# Patient Record
Sex: FEMALE | Race: WHITE | Employment: FULL TIME | URBAN - METROPOLITAN AREA
[De-identification: names, ages, dates, MRNs, and addresses within clinical notes are randomized per-mention and may not be internally consistent; named-entity substitution may affect disease eponyms.]

---

## 2018-08-29 ENCOUNTER — OFFICE VISIT (OUTPATIENT)
Dept: INTERNAL MEDICINE CLINIC | Age: 43
End: 2018-08-29

## 2018-08-29 VITALS
SYSTOLIC BLOOD PRESSURE: 109 MMHG | OXYGEN SATURATION: 98 % | TEMPERATURE: 99.3 F | HEART RATE: 72 BPM | RESPIRATION RATE: 18 BRPM | HEIGHT: 66 IN | BODY MASS INDEX: 24.17 KG/M2 | WEIGHT: 150.4 LBS | DIASTOLIC BLOOD PRESSURE: 74 MMHG

## 2018-08-29 DIAGNOSIS — R63.5 WEIGHT GAIN: Primary | ICD-10-CM

## 2018-08-29 DIAGNOSIS — Z00.00 PREVENTATIVE HEALTH CARE: ICD-10-CM

## 2018-08-29 DIAGNOSIS — M19.90 INFLAMMATORY ARTHRITIS: ICD-10-CM

## 2018-08-29 RX ORDER — PHENTERMINE HYDROCHLORIDE 37.5 MG/1
37.5 TABLET ORAL
Qty: 30 TAB | Refills: 2 | Status: SHIPPED | OUTPATIENT
Start: 2018-08-29 | End: 2019-09-20 | Stop reason: ALTCHOICE

## 2018-08-29 NOTE — PROGRESS NOTES
HISTORY OF PRESENT ILLNESS Chief Complaint Patient presents with  Weight Management Presents for follow-up. Last seen by me in 2013. 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 She cut back wine and diet about 9 months ago and could not lose weight. Did alfie health diet and lost 10 lb in 2 weeks in May 2018, but then stopped losing weight. Also took prednisone for eustachian tube dysfunction. She is exercising more. Caloric intake 1500 + exercise, then added 500 kush and did not lose weight. Review of Systems All other systems reviewed and are negative, except as noted in HPI Past Medical and Surgical History 
 has a past medical history of Abnormal serum ACE level (2012); Chest pain; Inflammatory arthritis; Intestinal bacterial overgrowth; and Weight gain, abnormal (2011). has a past surgical history that includes hx tonsillectomy and hm sigmoidoscopy. reports that she has never smoked. She has never used smokeless tobacco. She reports that she drinks about 1.0 oz of alcohol per week  She reports that she does not use illicit drugs. family history includes Cancer in her father; Coronary Artery Disease (age of onset: 72) in her father; Depression in her father; Diabetes in her paternal grandmother; Hypertension in her mother; Thyroid Disease in her mother. Physical Exam  
Nursing note and vitals reviewed. Blood pressure 109/74, pulse 72, temperature 99.3 °F (37.4 °C), temperature source Oral, resp. rate 18, height 5' 6\" (1.676 m), weight 150 lb 6.4 oz (68.2 kg), last menstrual period 08/28/2018, SpO2 98 %. Constitutional:  No distress. Eyes: Conjunctivae are normal.  
Ears:  Hearing grossly intact Cardiovascular: Normal rate. regular rhythm, no murmurs or gallops No edema Pulmonary/Chest: Effort normal.   CTAB Musculoskeletal: moves all 4 extremities Neurological: Alert and oriented to person, place, and time. Skin: No rash noted. Psychiatric: Normal mood and affect. Behavior is normal.  
 
ASSESSMENT and PLAN Diagnoses and all orders for this visit: 
 
1. Weight gain - difficulty losing weight despite diet, exercise. She likely has a new set point. Check labs. Given option of phentermine.   
-     TSH 3RD GENERATION 
-     T4, FREE 
-     METABOLIC PANEL, COMPREHENSIVE 
-     CORTISOL, URINE FREE 24 HR 
-     INSULIN 
-     VITAMIN D, 25 HYDROXY 
-     T3 TOTAL 
-     LIPID PANEL 
-     C REACTIVE PROTEIN, QT 
-     phentermine (ADIPEX-P) 37.5 mg tablet; Take 1 Tab by mouth every morning. Max Daily Amount: 37.5 mg. Take 1/2 pill in AM and 1/2 pill in early afternoon 2. Inflammatory arthritis - Currently asymptomatic -     C REACTIVE PROTEIN, QT 3. Preventative health care -     LIPID PANEL 
 
 
lab results and schedule of future lab studies reviewed with patient 
reviewed medications and side effects in detail Return to clinic for further evaluation if new symptoms develop Follow-up Disposition: Not on File Current Outpatient Prescriptions Medication Sig  
 omega 3-dha-epa-fish oil (FISH OIL) 100-160-1,000 mg cap Take 1 Tab by mouth daily.  Calcium-Cholecalciferol, D3, 600 mg(1,500mg) -400 unit chew Take 1 Tab by mouth daily.  phentermine (ADIPEX-P) 37.5 mg tablet Take 1 Tab by mouth every morning. Max Daily Amount: 37.5 mg. Take 1/2 pill in AM and 1/2 pill in early afternoon  fluticasone (FLONASE) 50 mcg/actuation nasal spray SHAKE WELL AND USE 2 SPRAYS IN EACH NOSTRIL EVERY DAY  MULTIVITAMIN (MULTIPLE VITAMIN PO) Take 1 Tab by mouth daily.  hydroxychloroquine (PLAQUENIL) 200 mg tablet  ethinyl estradiol-etonogestrel (NUVARING) 0.12-0.015 mg/24 hr vaginal ring by Intravaginally route. No current facility-administered medications for this visit.

## 2018-08-29 NOTE — MR AVS SNAPSHOT
Tyree Martinez 
 
 
 [address] [facility] [address] [facility] 
[phone] Patient: Marco Rubi MRN: [id number] Archie Robledo Visit Information Date & Time Provider Department Dept. Phone Encounter #  
 8/29/2018  3:45 PM Jeovany Black MD [facility] [address] [facility] [id number] Your Appointments 10/3/2018  2:45 PM  
Complete Physical with Jeovany Black MD  
[facility] Appt Note: annual CPE . Mabeline Sink af 06/28/18  
 [address] [facility] [address]  
[phone]  
  
   
 [address] [facility] [address] Upcoming Health Maintenance Date Due  
 PAP AKA CERVICAL CYTOLOGY 8/18/1995 DTaP/Tdap/Td series (1 - Tdap) 4/24/2007 Influenza Age 5 to Adult 8/1/2018 Allergies as of 8/29/2018  Review Complete On: 8/29/2018 By: Jeovany Black MD  
 No Known Allergies Current Immunizations  Reviewed on 8/14/2014 Name Date Influenza Vaccine (Whole Virus) 10/1/2012 TD Vaccine 4/23/2007 Not reviewed this visit You Were Diagnosed With   
  
 Codes Comments Weight gain    -  Primary ICD-10-CM: R63.5 ICD-9-CM: 783.1 Inflammatory arthritis     ICD-10-CM: M19.90 ICD-9-CM: 714.9 Preventative health care     ICD-10-CM: Z00.00 ICD-9-CM: V70.0 Vitals BP Pulse Temp Resp Height(growth percentile) Weight(growth percentile) 109/74 (BP 1 Location: Left arm, BP Patient Position: Sitting) 72 99.3 °F (37.4 °C) (Oral) 18 5' 6\" (1.676 m) 150 lb 6.4 oz (68.2 kg) LMP SpO2 BMI OB Status Smoking Status 08/28/2018 98% 24.28 kg/m2 Having regular periods Never Smoker Vitals History BMI and BSA Data Body Mass Index Body Surface Area  
 24.28 kg/m 2 1.78 m 2 Preferred Pharmacy Pharmacy Name Phone [facility] [address] AT [address]  [phone] Your Updated Medication List  
  
   
This list is accurate as of 8/29/18  4:30 PM.  Always use your most recent med list.  
  
  
  
  
 Calcium-Cholecalciferol (D3) 600 mg(1,500mg) -400 unit Chew Take 1 Tab by mouth daily. ethinyl estradiol-etonogestrel 0.12-0.015 mg/24 hr vaginal ring Commonly known as:  NUVARING  
by Intravaginally route. FISH -160-1,000 mg Cap Generic drug:  omega 3-dha-epa-fish oil Take 1 Tab by mouth daily. fluticasone 50 mcg/actuation nasal spray Commonly known as:  Nely Fryer SHAKE WELL AND USE 2 SPRAYS IN EACH NOSTRIL EVERY DAY  
  
 hydroxychloroquine 200 mg tablet Commonly known as:  PLAQUENIL  
  
 MULTIPLE VITAMIN PO Take 1 Tab by mouth daily. phentermine 37.5 mg tablet Commonly known as:  ADIPEX-P Take 1 Tab by mouth every morning. Max Daily Amount: 37.5 mg. Take 1/2 pill in AM and 1/2 pill in early afternoon Prescriptions Printed Refills  
 phentermine (ADIPEX-P) 37.5 mg tablet 2 Sig: Take 1 Tab by mouth every morning. Max Daily Amount: 37.5 mg. Take 1/2 pill in AM and 1/2 pill in early afternoon Class: Print Route: Oral  
  
We Performed the Following C REACTIVE PROTEIN, QT [27860 CPT(R)] CORTISOL, URINE FREE 24 HR [40850 CPT(R)] INSULIN [id number] CPT(R)] LIPID PANEL [19925 CPT(R)] METABOLIC PANEL, COMPREHENSIVE [63395 CPT(R)]   
 T3 TOTAL [63383 CPT(R)] T4, FREE [id number] CPT(R)] TSH 3RD GENERATION [83798 CPT(R)] VITAMIN D, 25 HYDROXY [id number] CPT(R)] Introducing [facility] & HEALTH SERVICES! Dear [facility]: Thank you for requesting a [employer] account. Our records indicate that you already have an active [employer] account. You can access your account anytime at [email or web address] Did you know that you can access your hospital and ER discharge instructions at any time in [employer]? You can also review all of your test results from your hospital stay or ER visit. Additional Information If you have questions, please visit the Frequently Asked Questions section of the [employer] website at [email or web address] Remember, [employer] is NOT to be used for urgent needs. For medical emergencies, dial 911. Now available from your iPhone and Android! Please provide this summary of care documentation to your next provider. Your primary care clinician is listed as Joie Fletcher. If you have any questions after today's visit, please call [phone].

## 2018-09-06 LAB
25(OH)D3+25(OH)D2 SERPL-MCNC: 36.9 NG/ML (ref 30–100)
ALBUMIN SERPL-MCNC: 4.4 G/DL (ref 3.5–5.5)
ALBUMIN/GLOB SERPL: 1.8 {RATIO} (ref 1.2–2.2)
ALP SERPL-CCNC: 73 IU/L (ref 39–117)
ALT SERPL-CCNC: 12 IU/L (ref 0–32)
AST SERPL-CCNC: 21 IU/L (ref 0–40)
BILIRUB SERPL-MCNC: 0.7 MG/DL (ref 0–1.2)
BUN SERPL-MCNC: 14 MG/DL (ref 6–24)
BUN/CREAT SERPL: 16 (ref 9–23)
CALCIUM SERPL-MCNC: 9.2 MG/DL (ref 8.7–10.2)
CHLORIDE SERPL-SCNC: 104 MMOL/L (ref 96–106)
CHOLEST SERPL-MCNC: 188 MG/DL (ref 100–199)
CO2 SERPL-SCNC: 22 MMOL/L (ref 20–29)
CORTIS F 24H UR-MRATE: 22 UG/24 HR (ref 0–50)
CORTIS F UR-MCNC: 10 UG/L
CREAT SERPL-MCNC: 0.85 MG/DL (ref 0.57–1)
CRP SERPL-MCNC: 1.4 MG/L (ref 0–4.9)
GLOBULIN SER CALC-MCNC: 2.4 G/DL (ref 1.5–4.5)
GLUCOSE SERPL-MCNC: 88 MG/DL (ref 65–99)
HDLC SERPL-MCNC: 73 MG/DL
INSULIN SERPL-ACNC: 2.8 UIU/ML (ref 2.6–24.9)
INTERPRETATION, 910389: NORMAL
LDLC SERPL CALC-MCNC: 100 MG/DL (ref 0–99)
POTASSIUM SERPL-SCNC: 4.2 MMOL/L (ref 3.5–5.2)
PROT SERPL-MCNC: 6.8 G/DL (ref 6–8.5)
SODIUM SERPL-SCNC: 140 MMOL/L (ref 134–144)
T3 SERPL-MCNC: 80 NG/DL (ref 71–180)
T4 FREE SERPL-MCNC: 1.23 NG/DL (ref 0.82–1.77)
TRIGL SERPL-MCNC: 73 MG/DL (ref 0–149)
TSH SERPL DL<=0.005 MIU/L-ACNC: 2.18 UIU/ML (ref 0.45–4.5)
VLDLC SERPL CALC-MCNC: 15 MG/DL (ref 5–40)

## 2019-03-08 ENCOUNTER — TELEPHONE (OUTPATIENT)
Dept: INTERNAL MEDICINE CLINIC | Age: 44
End: 2019-03-08

## 2019-03-08 NOTE — TELEPHONE ENCOUNTER
Patient called to report that her insurance company pulled medical records. Stated that DR. Moore had put a diagnoses code for Sarcoidosis in her chart. Patient states that she had been checked for this but never diagnosed with it. Patient now needs a letter stating that she doesn't have this from DR. Moore. Please mail to patient at address on file. Patient is aware that DR. Moore will not be in the office next week.      [phone]

## 2019-03-08 NOTE — TELEPHONE ENCOUNTER
Per chart review, there is a note from  [facility] that work up for sarcoid but negative for sarcoidosis.   We can possibly cut and paste part of note in letter for pt?

## 2019-03-27 ENCOUNTER — TELEPHONE (OUTPATIENT)
Dept: INTERNAL MEDICINE CLINIC | Age: 44
End: 2019-03-27

## 2019-03-27 NOTE — TELEPHONE ENCOUNTER
Patient is following up on a request from 03/08 states she's never heard back form any one , requesting this msge be marked urgent so she doesn't have any further delays in communication , she can be reached at [phone], requesting to speak with the nurse today

## 2019-09-18 ENCOUNTER — TELEPHONE (OUTPATIENT)
Dept: INTERNAL MEDICINE CLINIC | Age: 44
End: 2019-09-18

## 2019-09-18 NOTE — TELEPHONE ENCOUNTER
Pt called to schedule an appt with PCP for chest tightness/palpiations. Call was transfer to nurse. I spoke with patient, sx have been present on/off x 6 months. Sx occur mostly after exercise and linger. She experience palpitations after her workout today and decided to call to make an appt. I advise that pt needs to be seen in the ER now for evaluation. Pt declines, states her sx are now gone and she doesn't feel that this is emergent. She had an evaluation years ago by another MD and everything was fine. She wants an appt to see pcp, appt schedule with pcp. I advise if sx return and/or get worse needs ER evaluation prior to appt in the office. Pt verbalized understanding.

## 2019-09-20 ENCOUNTER — OFFICE VISIT (OUTPATIENT)
Dept: INTERNAL MEDICINE CLINIC | Age: 44
End: 2019-09-20

## 2019-09-20 ENCOUNTER — HOSPITAL ENCOUNTER (OUTPATIENT)
Dept: GENERAL RADIOLOGY | Age: 44
Discharge: HOME | End: 2019-09-20
Attending: INTERNAL MEDICINE
Payer: COMMERCIAL

## 2019-09-20 VITALS
TEMPERATURE: 98 F | SYSTOLIC BLOOD PRESSURE: 123 MMHG | BODY MASS INDEX: 25.07 KG/M2 | WEIGHT: 156 LBS | HEART RATE: 67 BPM | RESPIRATION RATE: 18 BRPM | OXYGEN SATURATION: 98 % | HEIGHT: 66 IN | DIASTOLIC BLOOD PRESSURE: 74 MMHG

## 2019-09-20 DIAGNOSIS — R00.2 PALPITATIONS: ICD-10-CM

## 2019-09-20 DIAGNOSIS — R07.89 SENSATION OF CHEST TIGHTNESS: Primary | ICD-10-CM

## 2019-09-20 DIAGNOSIS — Z13.220 SCREENING CHOLESTEROL LEVEL: ICD-10-CM

## 2019-09-20 DIAGNOSIS — R07.89 SENSATION OF CHEST TIGHTNESS: ICD-10-CM

## 2019-09-20 PROCEDURE — 71046 X-RAY EXAM CHEST 2 VIEWS: CPT

## 2019-09-21 LAB
BUN SERPL-MCNC: 16 MG/DL (ref 6–24)
BUN/CREAT SERPL: 17 (ref 9–23)
CALCIUM SERPL-MCNC: 9.6 MG/DL (ref 8.7–10.2)
CHLORIDE SERPL-SCNC: 98 MMOL/L (ref 96–106)
CHOLEST SERPL-MCNC: 221 MG/DL (ref 100–199)
CO2 SERPL-SCNC: 27 MMOL/L (ref 20–29)
CREAT SERPL-MCNC: 0.92 MG/DL (ref 0.57–1)
ERYTHROCYTE [DISTWIDTH] IN BLOOD BY AUTOMATED COUNT: 12.6 % (ref 12.3–15.4)
GLUCOSE SERPL-MCNC: 89 MG/DL (ref 65–99)
HCT VFR BLD AUTO: 40.8 % (ref 34–46.6)
HDLC SERPL-MCNC: 76 MG/DL
HGB BLD-MCNC: 13.7 G/DL (ref 11.1–15.9)
INTERPRETATION, 910389: NORMAL
LDLC SERPL CALC-MCNC: 120 MG/DL (ref 0–99)
MCH RBC QN AUTO: 29.8 PG (ref 26.6–33)
MCHC RBC AUTO-ENTMCNC: 33.6 G/DL (ref 31.5–35.7)
MCV RBC AUTO: 89 FL (ref 79–97)
PLATELET # BLD AUTO: 182 X10E3/UL (ref 150–450)
POTASSIUM SERPL-SCNC: 4.5 MMOL/L (ref 3.5–5.2)
RBC # BLD AUTO: 4.59 X10E6/UL (ref 3.77–5.28)
SODIUM SERPL-SCNC: 139 MMOL/L (ref 134–144)
T4 FREE SERPL-MCNC: 1.1 NG/DL (ref 0.82–1.77)
TRIGL SERPL-MCNC: 124 MG/DL (ref 0–149)
TSH SERPL DL<=0.005 MIU/L-ACNC: 2.86 UIU/ML (ref 0.45–4.5)
VLDLC SERPL CALC-MCNC: 25 MG/DL (ref 5–40)
WBC # BLD AUTO: 4.7 X10E3/UL (ref 3.4–10.8)

## 2019-09-21 NOTE — PROGRESS NOTES
HISTORY OF PRESENT ILLNESS    Chief Complaint   Patient presents with    Chest Pain     off and on for a couple months, not short of breath just tightness    Palpitations     occasional     Chest pain history:  Mg Carrillo reports 2 months onset of chest discomfort described as tightness. in the middle  chest.  The discomfort is intermittent (> 1 hour). Mild severity she's had several episodes daily. she is not currently having chest discomfort. she denies radiation of discomfort to the bilateral  arm. Associated symptoms include: palpitations. Cardiac risk factors include:  none. she has not had a past history of cardiovascular disease but had stress echo 2009. She also reports intermittent palpitations and her pulse increased to 190 while exercising. It was usually 130-150. Palpitations are mild. Denies dizziness or syncope. Denies obvious GERD s/s. S/s do sometimes worsen when supine. No dysphagia. S/s no worse w eating  Denies nsaid use, stimulant decomgestants    Hx of abnormal ACE level, but no clear dx of sarcoid. Denies cough or SOB. Review of Systems   All other systems reviewed and are negative, except as noted in HPI    Past Medical and Surgical History   has a past medical history of Abnormal serum ACE level (2012), Chest pain, DDD (degenerative disc disease), cervical, Eustachian tube dysfunction (05/2018), Inflammatory arthritis, Intestinal bacterial overgrowth, and Weight gain, abnormal (2011). has a past surgical history that includes hx tonsillectomy and hm sigmoidoscopy. reports that she has never smoked. She has never used smokeless tobacco. She reports that she drinks about 1.7 standard drinks of alcohol per week. She reports that she does not use drugs.   family history includes Cancer in her father; Coronary Artery Disease (age of onset: 72) in her father; Depression in her father; Diabetes in her paternal grandmother; Hypertension in her mother; Thyroid Disease in her mother. Physical Exam   Nursing note and vitals reviewed. Blood pressure 123/74, pulse 67, temperature 98 °F (36.7 °C), temperature source Oral, resp. rate 18, height 5' 6\" (1.676 m), weight 156 lb (70.8 kg), SpO2 98 %. Constitutional: In no distress. Eyes: Conjunctivae are normal.  HEENT:  No LAD or thyromegaly   Cardiovascular: Normal rate. regular rhythm. No murmurs  No edema  Pulmonary/Chest: Effort normal. clear to ausculation blaterally  Musculoskeletal:  no edema. Abd:  Neurological: Alert and oriented. Grossly intact cranial nerves and motor function. Skin: No rash noted. Psychiatric: Normal mood and affect. Behavior is normal.     ASSESSMENT and PLAN  Diagnoses and all orders for this visit:    1. Sensation of chest tightness  Unclear etiology. Suspect this is GERD, MSK pain or anxiety, less likely cardiac, unless associated w arrhythmia. .    Will work up as below  CXR to eval for hilar LAD, nodules, but no s/s sarcoid  Consider 2 week trial of PPI    2. Palpitations  Mild, mostly with exercise. EKG normal.    Need to eval for SVT, atrial arrhythmia . Stress ekg first.  Past echo normal, so less likely structural  Check labs  Consider cardiology referral/event monitor  -     AMB POC EKG ROUTINE W/ 12 LEADS, INTER & REP  -     TSH 3RD GENERATION  -     T4, FREE  -     METABOLIC PANEL, BASIC  -     EXERCISE CARDIAC STRESS TEST; Future    3. Screening cholesterol level  -     LIPID PANEL  -     CBC W/O DIFF    Other orders  -     CBC W/O DIFF  -     LIPID PANEL  -     CVD REPORT        lab results and schedule of future lab studies reviewed with patient  reviewed medications and side effects in detail    Return to clinic for further evaluation if new symptoms develop or if current symptoms worsen or fail to resolve. There are no Patient Instructions on file for this visit.

## 2021-05-04 ENCOUNTER — OFFICE VISIT (OUTPATIENT)
Dept: INTERNAL MEDICINE CLINIC | Age: 46
End: 2021-05-04
Payer: COMMERCIAL

## 2021-05-04 VITALS
DIASTOLIC BLOOD PRESSURE: 84 MMHG | BODY MASS INDEX: 25.88 KG/M2 | TEMPERATURE: 98.6 F | HEIGHT: 66 IN | SYSTOLIC BLOOD PRESSURE: 121 MMHG | OXYGEN SATURATION: 97 % | HEART RATE: 79 BPM | RESPIRATION RATE: 16 BRPM | WEIGHT: 161 LBS

## 2021-05-04 DIAGNOSIS — S61.211A LACERATION OF LEFT INDEX FINGER WITHOUT FOREIGN BODY WITHOUT DAMAGE TO NAIL, INITIAL ENCOUNTER: Primary | ICD-10-CM

## 2021-05-04 DIAGNOSIS — Z23 ENCOUNTER FOR IMMUNIZATION: ICD-10-CM

## 2021-05-04 PROCEDURE — 90471 IMMUNIZATION ADMIN: CPT | Performed by: NURSE PRACTITIONER

## 2021-05-04 PROCEDURE — 90715 TDAP VACCINE 7 YRS/> IM: CPT | Performed by: NURSE PRACTITIONER

## 2021-05-04 PROCEDURE — 99214 OFFICE O/P EST MOD 30 MIN: CPT | Performed by: NURSE PRACTITIONER

## 2021-05-04 NOTE — PROGRESS NOTES
Wayne Lesch (: 1975) is a 54 y.o. female, established patient, here for evaluation of the following chief complaint(s): Other (a month ago cut her finger bleeding, swelling, not infected - )       ASSESSMENT/PLAN:  Below is the assessment and plan developed based on review of pertinent history, physical exam, labs, studies, and medications. 1. Laceration of left index finger without foreign body without damage to nail, initial encounter -- wound cleaned; edges approximated and steri-strips applied. Wound redressed and finger splint given to wear for the next 48 hours before removing. Continue to change bandage on a daily basis but advised to leave steri-strips in place until edges curl. If fails to heal, will need to involve hand specialist.    2. Encounter for immunization  -     TETANUS, [address] [place] [address] ([facility]), IN INDIVIDS. >=7, IM  -     NH IMMUNIZ ADMIN,1 SINGLE/COMB VAC/TOXOID        SUBJECTIVE/OBJECTIVE:  HPI    Patient of Dr Ryder who has not been seen in office since 2019 presents with complaints of nonhealing wound to left index finger; reports she was using a new knife 4 weeks ago and accidentally cut her finger across knuckle. Area bled profusely but she elected not to be seen in ED for sutures so she applied pressure and keep finger immobilized in a splint for 2-3 weeks. Removed splint and was assisting her son with moving when she hit left finger against an object and wound began to bleed again. Has not noted any redness to site or purulent drainage from wound. Last tetanus over 10 years ago. Denies fever, chills.     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   Social Needs    Financial resource strain: Not on file    Food insecurity     Worry: Not on file     Inability: Not on file   [employer] needs     Medical: Not on file     Non-medical: Not on file   Tobacco Use    Smoking status: Never Smoker    Smokeless tobacco: Never Used   Substance and Sexual Activity    Alcohol use: Yes     Alcohol/week: 1.7 standard drinks     Types: 2 Glasses of wine per week     Comment: occasionally    Drug use: No    Sexual activity: Yes     Partners: Male     Birth control/protection: Surgical   Lifestyle    Physical activity     Days per week: Not on file     Minutes per session: Not on file    Stress: Not on file   Relationships    Social connections     Talks on phone: Not on file     Gets together: Not on file     Attends [employer] service: Not on file     Active member of club or organization: Not on file     Attends meetings of clubs or organizations: Not on file     Relationship status: Not on file    Intimate partner violence     Fear of current or ex partner: Not on file     Emotionally abused: Not on file     Physically abused: Not on file     Forced sexual activity: Not on file   Other Topics Concern    Not on file   Social History Narrative    Not on file     Family History   Problem Relation Age of Onset    Cancer Father         basal cell    Coronary Artery Disease Father 72    Depression Father     Hypertension Mother     Thyroid Disease Mother     Diabetes Paternal Grandmother      Current Outpatient Medications   Medication Sig    Calcium-Cholecalciferol, D3, 600 mg(1,500mg) -400 unit chew Take 1 Tab by mouth daily. No current facility-administered medications for this visit.       No Known Allergies  Immunization History   Administered Date(s) Administered    Influenza Vaccine (Whole Virus) 10/01/2012    TD Vaccine 04/23/2007         Review of Systems   Constitutional: Negative for chills and fever. Respiratory: Negative for cough and shortness of breath. Cardiovascular: Negative for chest pain. Musculoskeletal: Positive for myalgias (left index finger ). Neurological: Negative for headaches. /84 (BP 1 Location: Left upper arm, BP Patient Position: Sitting, BP Cuff Size: Adult)   Pulse 79   Temp 98.6 °F (37 °C) (Oral)   Resp 16   Ht 5' 6\" (1.676 m)   Wt 161 lb (73 kg)   SpO2 97%   BMI 25.99 kg/m²   Physical Exam  Vitals signs and nursing note reviewed. Constitutional:       General: She is not in acute distress. Appearance: Normal appearance. HENT:      Head: Normocephalic and atraumatic. Cardiovascular:      Rate and Rhythm: Normal rate and regular rhythm. Pulmonary:      Effort: Pulmonary effort is normal.      Breath sounds: Normal breath sounds. Musculoskeletal:      Left hand: She exhibits tenderness and laceration. Hands:       Comments: 8 mm laceration to left index finger over dorsal surface of PIP joint; no surrounding erythema. Neurological:      General: No focal deficit present. Mental Status: She is alert and oriented to person, place, and time. Psychiatric:         Mood and Affect: Mood normal.         Behavior: Behavior normal.           On this date 05/04/2021 I have spent 25 minutes reviewing previous notes, test results and face to face with the patient discussing the diagnosis and importance of compliance with the treatment plan as well as documenting on the day of the visit. An electronic signature was used to authenticate this note.   -- Garry Das NP

## 2021-05-04 NOTE — PATIENT INSTRUCTIONS
Cuts Closed With Adhesives: Care Instructions Your Care Instructions A cut can happen anywhere on your body. The doctor used an adhesive to close the cut. When the adhesive dries, it forms a film that holds the edges of the cut together. Skin adhesives are sometimes called liquid stitches. If the cut went deep and through the skin, the doctor may have put in a layer of stitches below the adhesive. The deeper layer of stitches brings the deep part of the cut together. These stitches will dissolve and don't need to be removed. You don't see the stitches, only the adhesive. You may have a bandage. The doctor has checked you carefully, but problems can develop later. If you notice any problems or new symptoms, get medical treatment right away. Follow-up care is a key part of your treatment and safety. Be sure to make and go to all appointments, and call your doctor if you are having problems. It's also a good idea to know your test results and keep a list of the medicines you take. How can you care for yourself at home? · Keep the cut dry for the first 24 to 48 hours. After this, you can shower if your doctor okays it. Pat the cut dry. · Don't soak the cut, such as in a bathtub. Your doctor will tell you when it's safe to get the cut wet. · If your doctor told you how to care for your cut, follow your doctor's instructions. If you did not get instructions, follow this general advice: ? Do not put any kind of ointment, cream, or lotion over the area. This can make the adhesive fall off too soon. ? After the first 24 to 48 hours, wash around the cut with clean water 2 times a day. Do not use hydrogen peroxide or alcohol, which can slow healing. ? If the doctor told you to use a bandage, put on a new bandage after cleaning the cut or if the bandage gets wet or dirty. · Prop up the sore area on a pillow anytime you sit or lie down during the next 3 days. Try to keep it above the level of your heart. This will help reduce swelling. · Leave the skin adhesive on your skin until it falls off on its own. This may take 5 to 10 days. · Do not scratch, rub, or pick at the adhesive. · Do not put the sticky part of a bandage directly on the adhesive. · Avoid any activity that could cause your cut to reopen. · Be safe with medicines. Read and follow all instructions on the label. ? If the doctor gave you a prescription medicine for pain, take it as prescribed. ? If you are not taking a prescription pain medicine, ask your doctor if you can take an over-the-counter medicine. When should you call for help? Call your doctor now or seek immediate medical care if: 
  · You have new pain, or your pain gets worse.  
  · The skin near the cut is cold or pale or changes color.  
  · You have tingling, weakness, or numbness near the cut.  
  · The cut starts to bleed.  
  · You have trouble moving the area near the cut.  
  · You have symptoms of infection, such as: 
? Increased pain, swelling, warmth, or redness around the cut. 
? Red streaks leading from the cut. 
? Pus draining from the cut. 
? A fever. Watch closely for changes in your health, and be sure to contact your doctor if: 
  · The cut reopens.  
  · You do not get better as expected. Where can you learn more? Go to http://www.gray.com/ Enter P174 in the search box to learn more about \"Cuts Closed With Adhesives: Care Instructions. \" Current as of: February 26, 2020               Content Version: 12.8 © 7208-3837 [employer]. Care instructions adapted under license by [employer] (which disclaims liability or warranty for this information). If you have questions about a medical condition or this instruction, always ask your healthcare professional. [place] [address] any warranty or liability for your use of this information.  
 
 
  
Tdap (Tetanus, Diphtheria, Pertussis) Vaccine: What You Need to Know Why get vaccinated? Tdap vaccine can prevent tetanus, diphtheria, and pertussis. Diphtheria and pertussis spread from person to person. Tetanus enters the body through cuts or wounds. · TETANUS (T) causes painful stiffening of the muscles. Tetanus can lead to serious health problems, including being unable to open the mouth, having trouble swallowing and breathing, or death. · DIPHTHERIA (D) can lead to difficulty breathing, heart failure, paralysis, or death. · PERTUSSIS (aP), also known as \"whooping cough,\" can cause uncontrollable, violent coughing which makes it hard to breathe, eat, or drink. Pertussis can be extremely serious in babies and young children, causing pneumonia, convulsions, brain damage, or death. In teens and adults, it can cause weight loss, loss of bladder control, passing out, and rib fractures from severe coughing. Tdap vaccine Tdap is only for children 7 years and older, adolescents, and adults. Adolescents should receive a single dose of Tdap, preferably at age 6 or 15 years. Pregnant women should get a dose of Tdap during every pregnancy, to protect the  from pertussis. Infants are most at risk for severe, life threatening complications from pertussis. Adults who have never received Tdap should get a dose of Tdap. Also, adults should receive a booster dose every 10 years, or earlier in the case of a severe and dirty wound or burn. Booster doses can be either Tdap or Td (a different vaccine that protects against tetanus and diphtheria but not pertussis). Tdap may be given at the same time as other vaccines. Talk with your health care provider Tell your vaccine provider if the person getting the vaccine: 
· Has had an allergic reaction after a previous dose of any vaccine that protects against tetanus, diphtheria, or pertussis, or has any severe, life threatening allergies.  
· Has had a coma, decreased level of consciousness, or prolonged seizures within 7 days after a previous dose of any pertussis vaccine (DTP, DTaP, or Tdap). · Has seizures or another nervous system problem. · Has ever had Guillain-Barré Syndrome (also called GBS). · Has had severe pain or swelling after a previous dose of any vaccine that protects against tetanus or diphtheria. In some cases, your health care provider may decide to postpone Tdap vaccination to a future visit. People with minor illnesses, such as a cold, may be vaccinated. People who are moderately or severely ill should usually wait until they recover before getting Tdap vaccine. Your health care provider can give you more information. Risks of a vaccine reaction · Pain, redness, or swelling where the shot was given, mild fever, headache, feeling tired, and nausea, vomiting, diarrhea, or stomachache sometimes happen after Tdap vaccine. People sometimes faint after medical procedures, including vaccination. Tell your provider if you feel dizzy or have vision changes or ringing in the ears. As with any medicine, there is a very remote chance of a vaccine causing a severe allergic reaction, other serious injury, or death. What if there is a serious problem? An allergic reaction could occur after the vaccinated person leaves the clinic. If you see signs of a severe allergic reaction (hives, swelling of the face and throat, difficulty breathing, a fast heartbeat, dizziness, or weakness), call 9-1-1 and get the person to the nearest hospital. 
For other signs that concern you, call your health care provider. Adverse reactions should be reported to the Vaccine Adverse Event Reporting System (VAERS). Your health care provider will usually file this report, or you can do it yourself. Visit the VAERS website at www.vaers. hhs.gov or call 2-901.572.8124. VAERS is only for reporting reactions, and VAERS staff do not give medical advice.  
The [employer] [place] Vaccine Injury Compensation Program 
The [employer] [place] Vaccine Injury Compensation Program (VICP) is a federal program that was created to compensate people who may have been injured by certain vaccines. Visit the VICP website at www.hrsa.gov/vaccinecompensation or call 6-149.857.7438 to learn about the program and about filing a claim. There is a time limit to file a claim for compensation. How can I learn more? · Ask your health care provider. · Call your local or state health department. · Contact the Centers for Disease Control and Prevention (CDC): 
? Call 2-355.188.2746 (1-800-CDC-INFO) or 
? Visit CDC's website at www.cdc.gov/vaccines Vaccine Information Statement (Interim) Tdap (Tetanus, Diphtheria, Pertussis) Vaccine 04/01/2020 
[id number] Centers for Disease Control and Prevention Many Vaccine Information Statements are available in Croatian and other languages. See www.immunize.org/vis. Muchas hojas de información sobre vacunas están disponibles en español y en otros idiomas. Visite www.immunize.org/vis. Care instructions adapted under license by [employer] (which disclaims liability or warranty for this information). If you have questions about a medical condition or this instruction, always ask your healthcare professional. [address]  any warranty or liability for your use of this information.

## 2021-05-05 NOTE — PROGRESS NOTES
Patient present for routine immunizations. Pt denies any symptoms , reactions or allergies that would exclude them from being immunized today. Risks and adverse reactions were discussed and the VIS was given to them. All questions were addressed. Pt was observed for 10 min post injection. There were no reactions observed.       Yessy Stephens LPN

## 2021-07-13 ENCOUNTER — OFFICE VISIT (OUTPATIENT)
Dept: INTERNAL MEDICINE CLINIC | Age: 46
End: 2021-07-13
Payer: COMMERCIAL

## 2021-07-13 VITALS
DIASTOLIC BLOOD PRESSURE: 81 MMHG | RESPIRATION RATE: 14 BRPM | BODY MASS INDEX: 26.68 KG/M2 | HEART RATE: 64 BPM | OXYGEN SATURATION: 98 % | WEIGHT: 166 LBS | SYSTOLIC BLOOD PRESSURE: 124 MMHG | HEIGHT: 66 IN | TEMPERATURE: 97.9 F

## 2021-07-13 DIAGNOSIS — M50.20 HNP (HERNIATED NUCLEUS PULPOSUS), CERVICAL: ICD-10-CM

## 2021-07-13 DIAGNOSIS — M79.645 PAIN IN FINGER OF LEFT HAND: ICD-10-CM

## 2021-07-13 DIAGNOSIS — G44.219 EPISODIC TENSION-TYPE HEADACHE, NOT INTRACTABLE: ICD-10-CM

## 2021-07-13 DIAGNOSIS — M62.838 CERVICAL PARASPINAL MUSCLE SPASM: Primary | ICD-10-CM

## 2021-07-13 PROCEDURE — 99214 OFFICE O/P EST MOD 30 MIN: CPT | Performed by: NURSE PRACTITIONER

## 2021-07-13 RX ORDER — DICLOFENAC SODIUM 50 MG/1
50 TABLET, DELAYED RELEASE ORAL 2 TIMES DAILY
Qty: 30 TABLET | Refills: 0 | Status: SHIPPED | OUTPATIENT
Start: 2021-07-13 | End: 2021-11-02 | Stop reason: ALTCHOICE

## 2021-07-13 RX ORDER — CYCLOBENZAPRINE HCL 10 MG
TABLET ORAL
Qty: 30 TABLET | Refills: 0 | Status: SHIPPED | OUTPATIENT
Start: 2021-07-13 | End: 2021-11-02 | Stop reason: ALTCHOICE

## 2021-07-13 NOTE — PATIENT INSTRUCTIONS
Tension Headache: Care Instructions  Overview  Most headaches are tension headaches. Some people get them often, especially if they have a lot of stress in their lives. This kind of headache may cause pain or a feeling of pressure all over your head. Sometimes it's hard to know where the center of the pain is. If you get a lot of these kind of headaches, the best way to reduce them is to find out what's causing them. Then you can make changes in those areas. Follow-up care is a key part of your treatment and safety. Be sure to make and go to all appointments, and call your doctor if you are having problems. It's also a good idea to know your test results and keep a list of the medicines you take. How can you care for yourself at home? · Rest in a quiet, dark room. Put a cool cloth on your forehead. Close your eyes, and try to relax or go to sleep. Do not watch TV, read, or use the computer. · Use a warm, moist towel or a heating pad set on low to relax tight shoulder and neck muscles. · Have someone gently massage your neck and shoulders. · Be safe with medicines. Read and follow all instructions on the label. ? If the doctor gave you a prescription medicine for pain, take it as prescribed. ? If you are not taking a prescription pain medicine, ask your doctor if you can take an over-the-counter medicine. · Be careful not to take more pain medicine than the instructions say. This is because you may get worse or more frequent headaches when the medicine wears off. · If you get a headache, stop what you are doing and sit quietly for a moment. Close your eyes and breathe slowly. Try to relax your head and neck muscles. · Pay attention to any new symptoms you have when you have a headache. These include a fever, weakness or numbness, vision changes, or confusion. They may be signs of a more serious problem. To help prevent headaches  · Keep a headache diary.  This can help you and your doctor figure out what triggers your headaches. If you avoid your triggers, you may be able to prevent headaches. · It's good to include several things in your headache diary. Write down when a headache begins and how long it lasts. Try to describe what the pain was like (throbbing, aching, stabbing, or dull). Then add anything you think may have triggered the headache. This could include stress, anxiety, or depression. It could also include hunger, anger, or fatigue. Sometimes, bad posture and muscle strain are triggers for people. · Find healthy ways to deal with stress. Headaches are most common during or right after stressful times. Take time to relax before and after you do something that caused a headache in the past.  · Get plenty of exercise every day. Go for a walk or jog, ride a bike, or find other ways to be active. This can help with stress and muscle tension. · Get regular sleep. · Eat regularly and well. If you wait too long to eat, it can trigger a headache. · If you have the time and money, you may want to try massage. Some people find that regular massages really help relieve tension. · Try to use good posture and keep the muscles of your jaw, face, neck, and shoulders relaxed. If you sit at a desk, change positions often. Try to stretch for 30 seconds every hour. · If you use a computer a lot, you can do things to make your eyes less tired. Try blinking more and sometimes looking away from the screen. Be sure to use glasses or contacts if you need them. And check that your monitor is about an arm's distance away from you. When should you call for help? Call 911 anytime you think you may need emergency care. For example, call if:    · You have signs of a stroke. These may include:  ? Sudden numbness, paralysis, or weakness in your face, arm, or leg, especially on only one side of your body. ? Sudden vision changes. ? Sudden trouble speaking.   ? Sudden confusion or trouble understanding simple statements. ? Sudden problems with walking or balance. ? A sudden, severe headache that is different from past headaches. Call your doctor now or seek immediate medical care if:    · You have new or worse nausea and vomiting.     · You have a new or higher fever.     · Your headache gets much worse. Watch closely for changes in your health, and be sure to contact your doctor if:    · You are not getting better after 2 days (48 hours). Where can you learn more? Go to http://www.gray.com/  Enter C544 in the search box to learn more about \"Tension Headache: Care Instructions. \"  Current as of: August 4, 2020               Content Version: 12.8  © 2344-4665 [employer]. Care instructions adapted under license by [employer] (which disclaims liability or warranty for this information). If you have questions about a medical condition or this instruction, always ask your healthcare professional. [place] [address] any warranty or liability for your use of this information. Neck Spasm: Care Instructions  Your Care Instructions  A neck spasm is sudden tightness and pain in your neck muscles. A spasm may be caused by some activities or repeated movements. For example, you may be more likely to have a neck spasm if you slouch, paint a ceiling, work at a computer, or sleep with your neck twisted. But the cause isn't always clear. Home treatment includes using heat or ice, taking over-the-counter (OTC) pain medicines, and avoiding activities that may lead to neck pain. Gentle stretching, or treatments such as massage or manipulation, may also help ease a neck spasm. For a neck spasm that doesn't get better with home care, your doctor may prescribe medicine. He or she may also suggest exercise or physical therapy to help strengthen or relax your neck muscles. Follow-up care is a key part of your treatment and safety.  Be sure to make and go to all appointments, and call your doctor if you are having problems. It's also a good idea to know your test results and keep a list of the medicines you take. How can you care for yourself at home? · To relieve pain, use heat or ice (whichever feels better) on the affected area. ? Put a warm water bottle, a heating pad set on low, or a warm cloth on your neck. Put a thin cloth between the heating pad and your skin. Do not go to sleep with a heating pad on your skin. ? Try ice or a cold pack on the area for 10 to 20 minutes at a time. Put a thin cloth between the ice and your skin. · Ask your doctor if you can take acetaminophen (such as Tylenol) or nonsteroidal anti-inflammatory drugs, such as ibuprofen or naproxen. Your doctor can prescribe stronger medicines if needed. Be safe with medicines. Read and follow all instructions on the label. · Stretch your muscles every day, especially before and after exercise and at bedtime. Regular stretching can help relax your muscles. · Try to find a pillow and a position in bed that help improve your night's rest.  · Try to stay active. It's best to start activity slowly. If an exercise makes your pain worse, stop doing it. When should you call for help? Call 911 anytime you think you may need emergency care. For example, call if:    · You are unable to move an arm or a leg at all. Call your doctor now or seek immediate medical care if:    · You have new or worse symptoms in your arms, legs, belly, or buttocks. Symptoms may include:  ? Numbness or tingling. ? Weakness. ? Pain.     · You lose bladder or bowel control. Watch closely for changes in your health, and be sure to contact your doctor if:    · You do not get better as expected. Where can you learn more? Go to http://www.gray.com/  Enter [id number] in the search box to learn more about \"Neck Spasm: Care Instructions. \"  Current as of: November 16, 2020               Content Version: 12.8  © 2006-2021 [employer]. Care instructions adapted under license by [employer] (which disclaims liability or warranty for this information). If you have questions about a medical condition or this instruction, always ask your healthcare professional. [address] any warranty or liability for your use of this information. Neck Spasm: Exercises  Introduction  Here are some examples of exercises for you to try. The exercises may be suggested for a condition or for rehabilitation. Start each exercise slowly. Ease off the exercises if you start to have pain. You will be told when to start these exercises and which ones will work best for you. How to do the exercises  Levator scapula stretch   1. Sit in a firm chair, or stand up straight. 2. Gently tilt your head toward your left shoulder. 3. Turn your head to look down into your armpit, bending your head slightly forward. Let the weight of your head stretch your neck muscles. 4. Hold for 15 to 30 seconds. 5. Return to your starting position. 6. Follow the same instructions above, but tilt your head toward your right shoulder. 7. Repeat 2 to 4 times toward each shoulder. Upper trapezius stretch   1. Sit in a firm chair, or stand up straight. 2. This stretch works best if you keep your shoulder down as you lean away from it. To help you remember to do this, start by relaxing your shoulders and lightly holding on to your thighs or your chair. 3. Tilt your head toward your shoulder and hold for 15 to 30 seconds. Let the weight of your head stretch your muscles. 4. If you would like a little added stretch, place your arm behind your back. Use the arm opposite of the direction you are tilting your head. For example, if you are tilting your head to the left, place your right arm behind your back. 5. Repeat 2 to 4 times toward each shoulder. Neck rotation   1.  Sit in a firm chair, or stand up straight. 2. Keeping your chin level, turn your head to the right, and hold for 15 to 30 seconds. 3. Turn your head to the left, and hold for 15 to 30 seconds. 4. Repeat 2 to 4 times to each side. Chin tuck   1. Lie on the floor with a rolled-up towel under your neck. Your head should be touching the floor. 2. Slowly bring your chin toward the front of your neck. 3. Hold for a count of 6, and then relax for up to 10 seconds. 4. Repeat 8 to 12 times. Forward neck flexion   1. Sit in a firm chair, or stand up straight. 2. Bend your head forward. 3. Hold for 15 to 30 seconds, then return to your starting position. 4. Repeat 2 to 4 times. Follow-up care is a key part of your treatment and safety. Be sure to make and go to all appointments, and call your doctor if you are having problems. It's also a good idea to know your test results and keep a list of the medicines you take. Where can you learn more? Go to http://www.gray.com/  Enter P962 in the search box to learn more about \"Neck Spasm: Exercises. \"  Current as of: November 16, 2020               Content Version: 12.8  © 2006-2021 [employer]. Care instructions adapted under license by [employer] (which disclaims liability or warranty for this information). If you have questions about a medical condition or this instruction, always ask your healthcare professional. [place] [address] any warranty or liability for your use of this information.

## 2021-07-13 NOTE — PROGRESS NOTES
Tre Stanley (: 1975) is a 54 y.o. female, established patient, here for evaluation of the following chief complaint(s):  Headache (started three weeks ago - she is having pressure on the back and sides of her head - pressure in her left ear ) and Finger Swelling (she would like her fnger looked at maybe a referral to see a specialist)       ASSESSMENT/PLAN:  Below is the assessment and plan developed based on review of pertinent history, physical exam, labs, studies, and medications. 1. Cervical paraspinal muscle spasm - cautioned regarding sedation with use of muscle relaxants; stretching exercises given. -     diclofenac EC (VOLTAREN) 50 mg EC tablet; Take 1 Tablet by mouth two (2) times a day., Normal, Disp-30 Tablet, R-0  -     cyclobenzaprine (FLEXERIL) 10 mg tablet; Take 1/2 tab to 1 tab up to 3 times daily as needed for muscle spasm, Normal, Disp-30 Tablet, R-0    2. Episodic tension-type headache, not intractable -- most likely MSK in nature but have to consider nerve impingement, early shingles. Advised to contact office if symptoms change or fail to improve. -     diclofenac EC (VOLTAREN) 50 mg EC tablet; Take 1 Tablet by mouth two (2) times a day., Normal, Disp-30 Tablet, R-0  -     cyclobenzaprine (FLEXERIL) 10 mg tablet; Take 1/2 tab to 1 tab up to 3 times daily as needed for muscle spasm, Normal, Disp-30 Tablet, R-0    3. HNP (herniated nucleus pulposus), cervical -- history of HNP C5-6, C6-7. Not currently having any radicular type pain. 4. Pain in finger of left hand -- concern about possible tendon injury due to previous laceration in 2021. Referred to hand specialist.  -     REFERRAL TO ORTHOPEDICS        SUBJECTIVE/OBJECTIVE:  HPI    Patient of Dr Denia Leo who presents with complaints of intermittent headaches over the past 3 weeks that begin as pressure to back of scalp and radiate to left side of head around temple/ear.   Have been occurring at random times and she is unable to identify any specific triggers. Reports they seem to be worse in the morning. Has been taking Ibuprofen 400 mg twice daily without much improvement. Denies any radicular pain down arms or numbness/tingling. Has history of Cervical disc herniation which has been managed medically and was seen by both Orthopedics and Neurosurgery in 2015. Has noted sensation of throbbing in left ear on occasion and has felt some swelling to side of scalp above left ear. Denies fever, chills, myalgias, dizziness, nausea, vomiting associated with headaches. Has been noting some eye strain when reading and is due to have vision rechecked. Continues to have pain and decreased ROM to left index finger for the past several months. Had laceration to finger in April 2021 and did not seek medical assistance at the time; reopened wound 4 weeks later and was seen in this office for care; steri-strips were applied and finger was immobilized briefly to allow for healing. Reports that she has increased pain when bending finger and does not feel like she has full range of motion. 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   Tobacco Use    Smoking status: Never Smoker    Smokeless tobacco: Never Used   Substance and Sexual Activity    Alcohol use:  Yes     Alcohol/week: 1.7 standard drinks     Types: 2 Glasses of wine per week     Comment: occasionally    Drug use: No    Sexual activity: Yes     Partners: Male     Birth control/protection: Surgical   Other Topics Concern    Not on file   Social History Narrative    Not on file     Social Determinants of Health     Financial Resource Strain:     Difficulty of Paying Living Expenses:    Food Insecurity:     Worried About Running Out of Food in the Last Year:     [address] in the Last Year:    Transportation Needs:     Lack of Transportation (Medical):  Lack of Transportation (Non-Medical):    Physical Activity:     Days of Exercise per Week:     Minutes of Exercise per Session:    Stress:     Feeling of Stress :    Social Connections:     Frequency of Communication with Friends and Family:     Frequency of Social Gatherings with Friends and Family:     Attends Anab[employer]ist Services:     Active Member of Clubs or Organizations:     Attends Club or Organization Meetings:     Marital Status:    Intimate Partner Violence:     Fear of Current or Ex-Partner:     Emotionally Abused:     Physically Abused:     Sexually Abused:      Family History   Problem Relation Age of Onset    Cancer Father         basal cell    Coronary Artery Disease Father 72    Depression Father     Hypertension Mother     Thyroid Disease Mother     Diabetes Paternal Grandmother      Current Outpatient Medications   Medication Sig    diclofenac EC (VOLTAREN) 50 mg EC tablet Take 1 Tablet by mouth two (2) times a day.  cyclobenzaprine (FLEXERIL) 10 mg tablet Take 1/2 tab to 1 tab up to 3 times daily as needed for muscle spasm     No current facility-administered medications for this visit. No Known Allergies  Immunization History   Administered Date(s) Administered    Influenza Vaccine (Whole Virus) 10/01/2012    TD Vaccine 04/23/2007    Tdap 05/05/2021       Review of Systems   Constitutional: Negative for chills and fever. HENT: Positive for ear pain.  Negative for congestion, ear discharge, sinus pressure, sinus pain and sore throat. Respiratory: Negative for cough and shortness of breath. Cardiovascular: Negative for chest pain. Musculoskeletal: Positive for joint swelling (left index finger), neck pain and neck stiffness. Neurological: Positive for headaches. Negative for tremors, syncope and numbness. Psychiatric/Behavioral: Negative for dysphoric mood. The patient is not nervous/anxious. /81 (BP 1 Location: Left upper arm, BP Patient Position: Sitting, BP Cuff Size: Adult)   Pulse 64   Temp 97.9 °F (36.6 °C) (Temporal)   Resp 14   Ht 5' 6\" (1.676 m)   Wt 166 lb (75.3 kg)   SpO2 98%   BMI 26.79 kg/m²   Physical Exam  Vitals and nursing note reviewed. Constitutional:       Appearance: Normal appearance. HENT:      Head: Normocephalic and atraumatic. Comments: Tenderness to palpation of left parietal and left temple areas; no skin rashes, erythema or edema noted     Right Ear: Tympanic membrane, ear canal and external ear normal.      Left Ear: Tympanic membrane, ear canal and external ear normal.      Nose: Nose normal.      Mouth/Throat:      Mouth: Mucous membranes are moist.      Pharynx: Oropharynx is clear. Neck:      Trachea: Trachea normal.        Comments: Tenderness to palpation of occipital scalp and cervical muscles. Cardiovascular:      Rate and Rhythm: Normal rate and regular rhythm. Pulmonary:      Effort: Pulmonary effort is normal.      Breath sounds: Normal breath sounds. Musculoskeletal:      Left hand: Decreased range of motion. Cervical back: Tenderness present. Muscular tenderness present. Decreased range of motion. Comments: Left index finger with scar tissue formation across PIP joint; decreased ROM to flexion   Lymphadenopathy:      Cervical: No cervical adenopathy. Skin:     General: Skin is warm and dry. Neurological:      General: No focal deficit present.       Mental Status: She is alert and oriented to person, place, and time. Psychiatric:         Mood and Affect: Mood normal.         Behavior: Behavior normal.               An electronic signature was used to authenticate this note.   -- Mcdonnell Officer, NP

## 2021-10-28 ENCOUNTER — TELEPHONE (OUTPATIENT)
Dept: INTERNAL MEDICINE CLINIC | Age: 46
End: 2021-10-28

## 2021-10-28 NOTE — TELEPHONE ENCOUNTER
In response to patient's [employer]ed[employer] message and NP's request, PSR has called and scheduled her for an appointment.

## 2021-11-02 ENCOUNTER — OFFICE VISIT (OUTPATIENT)
Dept: INTERNAL MEDICINE CLINIC | Age: 46
End: 2021-11-02
Payer: COMMERCIAL

## 2021-11-02 VITALS
HEIGHT: 66 IN | DIASTOLIC BLOOD PRESSURE: 63 MMHG | RESPIRATION RATE: 15 BRPM | OXYGEN SATURATION: 98 % | SYSTOLIC BLOOD PRESSURE: 112 MMHG | BODY MASS INDEX: 27.26 KG/M2 | TEMPERATURE: 98.2 F | WEIGHT: 169.6 LBS | HEART RATE: 68 BPM

## 2021-11-02 DIAGNOSIS — Z11.59 ENCOUNTER FOR HEPATITIS C SCREENING TEST FOR LOW RISK PATIENT: ICD-10-CM

## 2021-11-02 DIAGNOSIS — Z00.00 PREVENTATIVE HEALTH CARE: ICD-10-CM

## 2021-11-02 DIAGNOSIS — Z12.11 SCREEN FOR COLON CANCER: ICD-10-CM

## 2021-11-02 DIAGNOSIS — R51.9 LEFT TEMPORAL HEADACHE: Primary | ICD-10-CM

## 2021-11-02 DIAGNOSIS — M19.90 INFLAMMATORY ARTHRITIS: ICD-10-CM

## 2021-11-02 DIAGNOSIS — H53.9 VISUAL CHANGES: ICD-10-CM

## 2021-11-02 DIAGNOSIS — G44.52 NEW DAILY PERSISTENT HEADACHE: ICD-10-CM

## 2021-11-02 PROCEDURE — 99213 OFFICE O/P EST LOW 20 MIN: CPT | Performed by: INTERNAL MEDICINE

## 2021-11-02 RX ORDER — CHOLECALCIFEROL (VITAMIN D3) 125 MCG
CAPSULE ORAL
COMMUNITY

## 2021-11-02 RX ORDER — ASCORBIC ACID 500 MG
TABLET ORAL
COMMUNITY

## 2021-11-02 NOTE — PROGRESS NOTES
HISTORY OF PRESENT ILLNESS    Chief Complaint   Patient presents with    Headache     Left side. Presents with mild Left sided headache since 6/2021. It is constant. Mildly tender to touch left scalp temporally but also feels more internal discomfort. No pain w chewing. Reports she clenches her jaw. Denies visual change, nausea, dizziness. Headache is a little worse in AM, but does not wake her up. Taking ibuprofen 400 mg prn w some help. Denies any radicular pain down arms or numbness/tingling. Has history of Cervical disc herniation which has been managed medically and was seen by both Orthopedics and Neurosurgery in 2015. Was given diclofenac 50 mg bid and flexeril 1/2-1 10 mg hs 7/13/21 without resolution. No tinnitis. She does have history of inflammatory arthritis. Unclear etiology. Not sarcoid. She admits to some recent visual changes. She does have glasses and think she does not need to get a new prescription, but symptoms have been bilateral and worsening over the past couple of months. No double vision. No fever chills or weight loss. No significant arthralgias. Review of Systems   All other systems reviewed and are negative, except as noted in HPI    Past Medical and Surgical History   has a past medical history of Abnormal serum ACE level (2012), Chest pain, DDD (degenerative disc disease), cervical, Eustachian tube dysfunction (05/2018), Inflammatory arthritis, Intestinal bacterial overgrowth, and Weight gain, abnormal (2011). has a past surgical history that includes hx tonsillectomy and hm sigmoidoscopy. reports that she has never smoked. She has never used smokeless tobacco. She reports current alcohol use of about 1.7 standard drinks of alcohol per week. She reports that she does not use drugs.   family history includes Cancer in her father; Coronary Artery Disease (age of onset: 72) in her father; Depression in her father; Diabetes in her paternal grandmother; Hypertension in her mother; Thyroid Disease in her mother. Physical Exam   Nursing note and vitals reviewed. Blood pressure 112/63, pulse 68, temperature 98.2 °F (36.8 °C), temperature source Oral, resp. rate 15, height 5' 6\" (1.676 m), weight 169 lb 9.6 oz (76.9 kg), SpO2 98 %. Constitutional: In no distress. Eyes: Conjunctivae are normal.  HEENT:  No LAD or thyromegaly   Mild tenderness along left temporal region of head. Unclear if this is truly a temporal artery level or not. Cardiovascular: Normal rate. regular rhythm. No murmurs  No edema  Pulmonary/Chest: Effort normal. clear to ausculation blaterally  Musculoskeletal:  no edema. Abd:  Neurological: Alert and oriented. Grossly intact cranial nerves and motor function. Skin: No visible rash noted. Psychiatric: Normal mood and affect. Behavior is normal.     ASSESSMENT and PLAN  1. Left temporal headache  2. New daily persistent headache  3. Visual changes  4 months of left-sided headache which is new and persistent. Unclear etiology. No significant right-sided symptoms. Although his symptoms are consistent with TMJ, the fact that it is significantly unilateral is somewhat atypical and did not really improve with nsaids and Flexeril. Symptoms are mild and tolerable, but persistent and she is concerned about it being something serious. She would be okay to live with it if it were not something serious. I think it is very reasonable to evaluate for inflammatory process such as temporal arteritis. Visual changes may or may not be related to this. I think evaluating for an underlying brain process/tumor is also reasonable although I am hopeful unlikely. CT scan ordered which I think would be adequate to evaluate something in this location. Continue NSAIDs as needed. Can consider resuming Flexeril  -     SED RATE (ESR); Future  -     CT HEAD W CONT; Future    4. Inflammatory arthritis  Past history.   No other significant symptoms of inflammatory arthritis at this time.  -     SED RATE (ESR); Future  -     TSH 3RD GENERATION; Future    5. Encounter for hepatitis C screening test for low risk patient  -     HEPATITIS C AB; Future  6. Preventative health care  -     METABOLIC PANEL, COMPREHENSIVE; Future  -     LIPID PANEL; Future  7. Screen for colon cancer  -     REFERRAL TO GASTROENTEROLOGY  Recommend screening colonoscopy. lab results and schedule of future lab studies reviewed with patient  reviewed medications and side effects in detail    Return to clinic for further evaluation if new symptoms develop or if current symptoms worsen or fail to resolve. There are no Patient Instructions on file for this visit.

## 2021-11-03 LAB
ALBUMIN SERPL-MCNC: 3.7 G/DL (ref 3.5–5)
ALBUMIN/GLOB SERPL: 1.3 {RATIO} (ref 1.1–2.2)
ALP SERPL-CCNC: 64 U/L (ref 45–117)
ALT SERPL-CCNC: 26 U/L (ref 12–78)
ANION GAP SERPL CALC-SCNC: 2 MMOL/L (ref 5–15)
AST SERPL-CCNC: 17 U/L (ref 15–37)
BILIRUB SERPL-MCNC: 0.5 MG/DL (ref 0.2–1)
BUN SERPL-MCNC: 13 MG/DL (ref 6–20)
BUN/CREAT SERPL: 18 (ref 12–20)
CALCIUM SERPL-MCNC: 9.2 MG/DL (ref 8.5–10.1)
CHLORIDE SERPL-SCNC: 108 MMOL/L (ref 97–108)
CHOLEST SERPL-MCNC: 228 MG/DL
CO2 SERPL-SCNC: 30 MMOL/L (ref 21–32)
CREAT SERPL-MCNC: 0.72 MG/DL (ref 0.55–1.02)
ERYTHROCYTE [SEDIMENTATION RATE] IN BLOOD: 9 MM/HR (ref 0–20)
GLOBULIN SER CALC-MCNC: 2.8 G/DL (ref 2–4)
GLUCOSE SERPL-MCNC: 101 MG/DL (ref 65–100)
HCV AB SERPL QL IA: NONREACTIVE
HDLC SERPL-MCNC: 44 MG/DL
HDLC SERPL: 5.2 {RATIO} (ref 0–5)
LDLC SERPL CALC-MCNC: ABNORMAL MG/DL (ref 0–100)
LDLC SERPL DIRECT ASSAY-MCNC: 119 MG/DL (ref 0–100)
POTASSIUM SERPL-SCNC: 4.5 MMOL/L (ref 3.5–5.1)
PROT SERPL-MCNC: 6.5 G/DL (ref 6.4–8.2)
SODIUM SERPL-SCNC: 140 MMOL/L (ref 136–145)
TRIGL SERPL-MCNC: 408 MG/DL (ref ?–150)
TSH SERPL DL<=0.05 MIU/L-ACNC: 2.4 UIU/ML (ref 0.36–3.74)
VLDLC SERPL CALC-MCNC: ABNORMAL MG/DL

## 2021-11-08 ENCOUNTER — HOSPITAL ENCOUNTER (OUTPATIENT)
Dept: CT IMAGING | Age: 46
Discharge: HOME | End: 2021-11-08
Attending: INTERNAL MEDICINE
Payer: COMMERCIAL

## 2021-11-08 DIAGNOSIS — H53.9 VISUAL CHANGES: ICD-10-CM

## 2021-11-08 DIAGNOSIS — G44.52 NEW DAILY PERSISTENT HEADACHE: ICD-10-CM

## 2021-11-08 DIAGNOSIS — R51.9 LEFT TEMPORAL HEADACHE: ICD-10-CM

## 2021-11-08 PROCEDURE — 70450 CT HEAD/BRAIN W/O DYE: CPT

## 2022-03-19 PROBLEM — M50.20 HNP (HERNIATED NUCLEUS PULPOSUS), CERVICAL: Status: ACTIVE | Noted: 2021-07-13

## 2023-03-23 ENCOUNTER — OFFICE VISIT (OUTPATIENT)
Dept: INTERNAL MEDICINE CLINIC | Age: 48
End: 2023-03-23

## 2023-03-23 ENCOUNTER — OFFICE VISIT (OUTPATIENT)
Dept: SURGERY | Age: 48
End: 2023-03-23
Payer: COMMERCIAL

## 2023-03-23 VITALS
OXYGEN SATURATION: 98 % | TEMPERATURE: 98.6 F | HEART RATE: 74 BPM | HEIGHT: 66 IN | SYSTOLIC BLOOD PRESSURE: 126 MMHG | DIASTOLIC BLOOD PRESSURE: 80 MMHG | RESPIRATION RATE: 14 BRPM | WEIGHT: 166 LBS | BODY MASS INDEX: 26.68 KG/M2

## 2023-03-23 DIAGNOSIS — N60.82 CYST, BREAST, SEBACEOUS, LEFT: Primary | ICD-10-CM

## 2023-03-23 DIAGNOSIS — L72.0 EPIDERMAL CYST: Primary | ICD-10-CM

## 2023-03-23 PROCEDURE — 99213 OFFICE O/P EST LOW 20 MIN: CPT | Performed by: INTERNAL MEDICINE

## 2023-03-23 PROCEDURE — 10061 I&D ABSCESS COMP/MULTIPLE: CPT | Performed by: SURGERY

## 2023-03-23 PROCEDURE — 99203 OFFICE O/P NEW LOW 30 MIN: CPT | Performed by: SURGERY

## 2023-03-23 RX ORDER — DOXYCYCLINE 100 MG/1
100 CAPSULE ORAL 2 TIMES DAILY
Qty: 14 CAPSULE | Refills: 0 | Status: SHIPPED | OUTPATIENT
Start: 2023-03-23 | End: 2023-03-30

## 2023-03-23 RX ORDER — LANOLIN ALCOHOL/MO/W.PET/CERES
1 CREAM (GRAM) TOPICAL DAILY
COMMUNITY

## 2023-03-23 NOTE — PROGRESS NOTES
HISTORY OF PRESENT ILLNESS  Nelwyn Paget is a 49 y.o. female. HPI NEW patient consult referred by  Dr. Eleanor Loera for LEFT breast epidermal cyst that needs to be drained. Present for years. Tuesday it got red and painful after pressing against a chair. Family History:  Father has basal cell    Mammogram, 12/14/22, BIRADS 1 negative  VPFW    Past Medical History:   Diagnosis Date    Abnormal serum ACE level 2012    unclear etiology, Dr. Willian Smith, Dr. Hall Guard - not dx w sarcoid    Chest pain     stress echo normal 2/2009    DDD (degenerative disc disease), cervical     MRI 2015. Degenerative disc disease at C5-C6 and C6-C7. Disc protrusion at C6-C7. Eustachian tube dysfunction 05/2018    Dr. Deonte Atkinson.  took prednisone    Inflammatory arthritis     seronegative Dr. Willian Smith. elevated ACE (not sarcoid). took plaquenil (off since ~2015)    Intestinal bacterial overgrowth     Dr. Chrissie Aj    Weight gain, abnormal 2011    saw Dr. Дмитрий Garcia     Past Surgical History:   Procedure Laterality Date    HM SIGMOIDOSCOPY      rectocele 2009    HX TONSILLECTOMY        OB History    No obstetric history on file. Obstetric Comments   Menarche 5, LMP ablation, # of children 1, age of 4st delivery 21, Hysterectomy/oophorectomy no/no, Breast bx no, history of breast feeding yes, BCP yes, Hormone therapy no              Family History   Problem Relation Age of Onset    Cancer Father         basal cell    Coronary Art Dis Father 72    Depression Father     Hypertension Mother     Thyroid Disease Mother     Diabetes Paternal Grandmother      Social History     Tobacco Use    Smoking status: Never    Smokeless tobacco: Never   Substance Use Topics    Alcohol use: Yes     Alcohol/week: 1.7 standard drinks     Types: 2 Glasses of wine per week     Comment: occasionally      Prior to Admission medications    Medication Sig Start Date End Date Taking?  Authorizing Provider   glucosamine-chondroitin (ARTHX) 500-400 mg cap Take 1 Capsule by mouth daily. Yes Provider, Historical   doxycycline (MONODOX) 100 mg capsule Take 1 Capsule by mouth two (2) times a day for 7 days. 3/23/23 2/60/65 Yes Brad Muro MD   ibuprofen 100 mg tablet Take 100 mg by mouth every six (6) hours as needed for Pain. Yes Provider, Historical   ascorbic acid, vitamin C, (VITAMIN C) 500 mg tablet Take  by mouth. Yes Provider, Historical   cholecalciferol, vitamin D3, 50 mcg (2,000 unit) tab Take  by mouth. Yes Provider, Historical      No Known Allergies           ROS    Physical Exam  Chest:   Breasts:     Breasts are symmetrical.      Right: No inverted nipple, mass, nipple discharge, skin change or tenderness. Left: Swelling, skin change (2cm infected sebaceous cyst.  erythematous) and tenderness present. No inverted nipple, mass or nipple discharge. Lymphadenopathy:      Upper Body:      Right upper body: No supraclavicular or axillary adenopathy. Left upper body: No supraclavicular or axillary adenopathy. Incision and Drainage infected sebaceous cyst  Indication : LEFT breast infected sebaceous cyst  Prep : alcohol. Anesthesia : Lidocaine 1% with epi, 1 cc  Incision: 1 cm incision  Procedure: the abscess cavity was explored and 1cc exudate drained  The wound was cleaned with hydrogen peroxide and the cyst wall was debrided    Packin/4\" nugauze, approximately 2 cm  Diposition: Okay to shower. Change dressing as needed. Remove packing on Monday   ASSESSMENT and PLAN    ICD-10-CM ICD-9-CM    1. Cyst, breast, sebaceous, left  N60.82 610.8       Total time spent on chart review and patient visit: 30 minutes    Infected LEFT breast sebaceous cyst  Remove packing on Monday  If juan or infection recur consider surgical excision.

## 2023-03-23 NOTE — LETTER
3/23/2023    Patient: Nelwyn Paget   YOB: 1975   Date of Visit: 3/23/2023     Cher Wells MD  [facility] [address].  V[address] [facility], [address] [facility] [address] [facility] [address]  [address] [facility]  Via In Basket    Dear MD Eleanor Swanson MD,      Thank you for referring Ms. Shila Robles to [address] [facility] for evaluation. My notes for this consultation are attached. If you have questions, please do not hesitate to call me. I look forward to following your patient along with you.       Sincerely,    Dante Rich MD

## 2023-03-23 NOTE — PROGRESS NOTES
Note   Chief Complaint   Left breast cyst painful    Tess Gerber is a 49 y.o. female     1. Epidermal cyst  -     REFERRAL TO BREAST SURGERY  -     REFERRAL TO GENERAL SURGERY  -     doxycycline (MONODOX) 100 mg capsule; Take 1 Capsule by mouth two (2) times a day for 7 days. , Normal, Disp-14 Capsule, R-0     Left breast cyst - Developed a cyst a year ago   Has been seen by 2 OBGYN's and derm - advised to monitor  Has had 2 mammograms in that time, normal   Last week it started getting more painful, inflamed   No fevers, chills  Likely inflamed epidermal cyst. Recommend appt with breast surgery to see if there's anything to drain. Doxycycline for possible infection. Derm already gave her a referral for plastic surgery for removal    Benefits, risks, possible drug interactions, and side effects of all new medications were reviewed with the patient. Pt verbalized understanding. Return to clinic:  as needed    An electronic signature was used to authenticate this note. Ning Armstrong MD  [facility]  3/23/2023    Future Appointments   Date Time Provider Teresa Paige   3/23/2023  2:15 PM Verena Milton MD [id number] BS AMB        Objective   Vitals:       Visit Vitals  /80 (BP 1 Location: Left upper arm, BP Patient Position: Sitting, BP Cuff Size: Small adult)   Pulse 74   Temp 98.6 °F (37 °C) (Oral)   Resp 14   Ht 5' 6\" (1.676 m)   Wt 166 lb (75.3 kg)   SpO2 98%   BMI 26.79 kg/m²        Physical Exam  Constitutional:       Appearance: Normal appearance. She is not ill-appearing. Pulmonary:      Effort: No respiratory distress. Skin:     Comments: Left breast midline lower half area of induration and tenderness to palpation   Neurological:      Mental Status: She is alert. Current Outpatient Medications   Medication Sig    glucosamine-chondroitin (ARTHX) 500-400 mg cap Take 1 Capsule by mouth daily.     doxycycline (MONODOX) 100 mg capsule Take 1 Capsule by mouth two (2) times a day for 7 days. ibuprofen 100 mg tablet Take 100 mg by mouth every six (6) hours as needed for Pain. ascorbic acid, vitamin C, (VITAMIN C) 500 mg tablet Take  by mouth. cholecalciferol, vitamin D3, 50 mcg (2,000 unit) tab Take  by mouth. No current facility-administered medications for this visit.

## 2023-09-06 ENCOUNTER — TELEPHONE (OUTPATIENT)
Age: 48
End: 2023-09-06

## 2023-09-06 NOTE — TELEPHONE ENCOUNTER
Spoke with patient and she reports 3-7/10 positional pain for the last 3 weeks. Hx of bulging discs in neck 2015. She report that it has been under control until about 3 weeks ago with neck pain radiating down to left arm in the muscle. She has used Rehab in past and continues to do those exercises.  She reports steroids have given good relief in the past. She is asking for an appt and scheuidled for 9/11/23 at

## 2023-09-11 ENCOUNTER — OFFICE VISIT (OUTPATIENT)
Age: 48
End: 2023-09-11
Payer: COMMERCIAL

## 2023-09-11 VITALS
DIASTOLIC BLOOD PRESSURE: 80 MMHG | WEIGHT: 175.4 LBS | TEMPERATURE: 98.4 F | HEIGHT: 66 IN | SYSTOLIC BLOOD PRESSURE: 114 MMHG | HEART RATE: 75 BPM | OXYGEN SATURATION: 97 % | RESPIRATION RATE: 20 BRPM | BODY MASS INDEX: 28.19 KG/M2

## 2023-09-11 DIAGNOSIS — M54.12 LEFT CERVICAL RADICULOPATHY: Primary | ICD-10-CM

## 2023-09-11 DIAGNOSIS — M54.2 NECK PAIN: ICD-10-CM

## 2023-09-11 DIAGNOSIS — M50.20 HNP (HERNIATED NUCLEUS PULPOSUS), CERVICAL: ICD-10-CM

## 2023-09-11 PROCEDURE — 99213 OFFICE O/P EST LOW 20 MIN: CPT | Performed by: INTERNAL MEDICINE

## 2023-09-11 RX ORDER — PREDNISONE 20 MG/1
TABLET ORAL
Qty: 18 TABLET | Refills: 0 | Status: SHIPPED | OUTPATIENT
Start: 2023-09-11

## 2023-09-11 RX ORDER — CHLORAL HYDRATE 500 MG
CAPSULE ORAL DAILY
COMMUNITY

## 2023-09-11 RX ORDER — LANOLIN ALCOHOL/MO/W.PET/CERES
1000 CREAM (GRAM) TOPICAL DAILY
COMMUNITY

## 2023-09-11 RX ORDER — MAGNESIUM 200 MG
200 TABLET ORAL DAILY
COMMUNITY

## 2023-09-11 SDOH — ECONOMIC STABILITY: HOUSING INSECURITY
IN THE LAST 12 MONTHS, WAS THERE A TIME WHEN YOU DID NOT HAVE A STEADY PLACE TO SLEEP OR SLEPT IN A SHELTER (INCLUDING NOW)?: NO

## 2023-09-11 SDOH — ECONOMIC STABILITY: FOOD INSECURITY: WITHIN THE PAST 12 MONTHS, THE FOOD YOU BOUGHT JUST DIDN'T LAST AND YOU DIDN'T HAVE MONEY TO GET MORE.: NEVER TRUE

## 2023-09-11 SDOH — ECONOMIC STABILITY: INCOME INSECURITY: HOW HARD IS IT FOR YOU TO PAY FOR THE VERY BASICS LIKE FOOD, HOUSING, MEDICAL CARE, AND HEATING?: NOT HARD AT ALL

## 2023-09-11 SDOH — ECONOMIC STABILITY: FOOD INSECURITY: WITHIN THE PAST 12 MONTHS, YOU WORRIED THAT YOUR FOOD WOULD RUN OUT BEFORE YOU GOT MONEY TO BUY MORE.: NEVER TRUE

## 2023-09-11 NOTE — PROGRESS NOTES
HISTORY OF PRESENT ILLNESS    Chief Complaint   Patient presents with    Neck Pain     Radiating down left arm - bulging disc in neck - 3 or 4 weeks. Presents significant neck pain radiating down the left arm for 3 to 4 weeks. Radiates to her left index finger. Denies any weakness of the arm. She has a history of the same. Has been diagnosed with a herniated cervical disc based on previous evaluation by Dr. Nicole Suazo in orthopedics and Dr. Patricia Graf and neurosurgery. MRI was done 2015 which did show a disc protrusion at C6-7 and some foraminal stenosis at multiple levels. She denies any recent injury in. Has tried NSAIDs without improvement. She says she was given oral steroids in the past with improvement. Saw Dr. Sigifredo Berg in Rheumatology again w arthralgia. Labs normal per pt. Took plaquenil in the past and is electing to resume. Review of Systems   All other systems reviewed and are negative, except as noted in HPI    Past Medical and Surgical History   has a past medical history of Abnormal serum ACE level, Chest pain, DDD (degenerative disc disease), cervical, Eustachian tube dysfunction, Inflammatory arthritis, Intestinal bacterial overgrowth, and Weight gain, abnormal.     has a past surgical history that includes Tonsillectomy and hm sigmoidoscopy.     Current Outpatient Medications   Medication Sig    Omega-3 Fatty Acids (FISH OIL) 1000 MG capsule Take by mouth daily    vitamin B-12 (CYANOCOBALAMIN) 1000 MCG tablet Take 1 tablet by mouth daily    magnesium 200 MG TABS tablet Take 1 tablet by mouth daily    predniSONE (DELTASONE) 20 MG tablet Take 3 pills (60 mg) daily for 3 days, then 2 pills (40 mg) daily for 3 days, then 1 pill (20 mg) daily for 3 days    ascorbic acid (VITAMIN C) 500 MG tablet Take by mouth    Cholecalciferol 50 MCG (2000 UT) TABS Take by mouth    ibuprofen (ADVIL;MOTRIN) 100 MG tablet Take by mouth every 6 hours as needed     No current facility-administered medications for

## 2024-05-16 DIAGNOSIS — M50.20 HNP (HERNIATED NUCLEUS PULPOSUS), CERVICAL: ICD-10-CM

## 2024-05-16 DIAGNOSIS — M54.12 LEFT CERVICAL RADICULOPATHY: ICD-10-CM

## 2024-05-16 DIAGNOSIS — M54.2 NECK PAIN: ICD-10-CM

## 2024-05-16 RX ORDER — PREDNISONE 20 MG/1
TABLET ORAL
Qty: 18 TABLET | Refills: 0 | OUTPATIENT
Start: 2024-05-16

## 2025-03-04 ENCOUNTER — OFFICE VISIT (OUTPATIENT)
Age: 50
End: 2025-03-04

## 2025-03-04 VITALS
DIASTOLIC BLOOD PRESSURE: 85 MMHG | HEIGHT: 66 IN | TEMPERATURE: 98.6 F | SYSTOLIC BLOOD PRESSURE: 124 MMHG | BODY MASS INDEX: 29.09 KG/M2 | WEIGHT: 181 LBS | RESPIRATION RATE: 16 BRPM | OXYGEN SATURATION: 98 % | HEART RATE: 69 BPM

## 2025-03-04 DIAGNOSIS — J01.90 ACUTE BACTERIAL SINUSITIS: Primary | ICD-10-CM

## 2025-03-04 DIAGNOSIS — B37.9 ANTIBIOTIC-INDUCED YEAST INFECTION: ICD-10-CM

## 2025-03-04 DIAGNOSIS — B96.89 ACUTE BACTERIAL SINUSITIS: Primary | ICD-10-CM

## 2025-03-04 DIAGNOSIS — T36.95XA ANTIBIOTIC-INDUCED YEAST INFECTION: ICD-10-CM

## 2025-03-04 RX ORDER — ESTRADIOL 0.5 MG/.5G
GEL TOPICAL
COMMUNITY
Start: 2025-02-10

## 2025-03-04 RX ORDER — PROGESTERONE 200 MG/1
CAPSULE ORAL
COMMUNITY
Start: 2025-02-10

## 2025-03-04 RX ORDER — HYDROXYCHLOROQUINE SULFATE 200 MG/1
TABLET ORAL
COMMUNITY

## 2025-03-04 RX ORDER — FLUCONAZOLE 150 MG/1
150 TABLET ORAL ONCE
Qty: 1 TABLET | Refills: 0 | Status: SHIPPED | OUTPATIENT
Start: 2025-03-04 | End: 2025-03-04

## 2025-03-04 ASSESSMENT — ENCOUNTER SYMPTOMS
COUGH: 1
GASTROINTESTINAL NEGATIVE: 1
EYES NEGATIVE: 1
ALLERGIC/IMMUNOLOGIC NEGATIVE: 1

## 2025-03-05 NOTE — PATIENT INSTRUCTIONS
Thank you for visiting [facility] today.    Flonase (over the counter) nasal spray, once a day  Saline nasal sprays 1-3 days   Afrin nasal spray for no longer than 3-5 days  Ibuprofen (400-800 mg) every 8 hours as needed or Tylenol 325-500 mg every 6 hours  Zyrtec,Xyzal,Allegra,or Claritin during the day or Benadryl at night may help with allergies.  You may use the decongestant version of these medications as well.  Simple foods like chicken noodle soup, smoothies, hot tea with lemon and honey may also help  Steam inhalation, humidifier, warm compresses  Increase oral fluids to maintain hydration  Vitamin D 4000 IU each day for one month  1/2 teaspoon turmeric each day for anti-inflammation  Avoid smoking or exposure to smoke and minimize contact with environmental irritants  Antibiotics with food and probiotics     Please follow up with your primary care provider if your symptoms last more than 10 days or worsen.    Please go immediately to the Emergency Department if you develop:  Fever higher than 102F (38.9C), sudden and severe pain in the face and head, trouble seeing or seeing double, trouble thinking clearly, swelling or redness around one or both eyes or a stiff neck

## 2025-03-05 NOTE — PROGRESS NOTES
Normal rate and regular rhythm.   Pulmonary:      Effort: Pulmonary effort is normal. No respiratory distress.      Breath sounds: Normal breath sounds. No stridor. No wheezing, rhonchi or rales.   Chest:      Chest wall: No tenderness.   Musculoskeletal:      Cervical back: Normal range of motion.   Lymphadenopathy:      Cervical: No cervical adenopathy.   Skin:     General: Skin is warm and dry.   Neurological:      Mental Status: She is alert.   Psychiatric:         Mood and Affect: Mood normal.         Behavior: Behavior normal.         Thought Content: Thought content normal.         Judgment: Judgment normal.        Vitals:    03/04/25 1850   BP: 124/85   Site: Right Upper Arm   Position: Sitting   Cuff Size: Medium Adult   Pulse: 69   Resp: 16   Temp: 98.6 °F (37 °C)   TempSrc: Oral   SpO2: 98%   Weight: 82.1 kg (181 lb)   Height: 1.676 m (5' 6\")        No Known Allergies    Current Outpatient Medications   Medication Sig Dispense Refill    PLAQUENIL 200 MG tablet Take by mouth      progesterone (PROMETRIUM) 200 MG CAPS capsule       Estradiol 0.5 MG/0.5GM GEL       Omega-3 Fatty Acids (FISH OIL) 1000 MG capsule Take by mouth daily (Patient not taking: Reported on 3/4/2025)      vitamin B-12 (CYANOCOBALAMIN) 1000 MCG tablet Take 1 tablet by mouth daily (Patient not taking: Reported on 3/4/2025)      magnesium 200 MG TABS tablet Take 1 tablet by mouth daily (Patient not taking: Reported on 3/4/2025)      predniSONE (DELTASONE) 20 MG tablet Take 3 pills (60 mg) daily for 3 days, then 2 pills (40 mg) daily for 3 days, then 1 pill (20 mg) daily for 3 days (Patient not taking: Reported on 3/4/2025) 18 tablet 0    ascorbic acid (VITAMIN C) 500 MG tablet Take by mouth (Patient not taking: Reported on 3/4/2025)      Cholecalciferol 50 MCG (2000 UT) TABS Take by mouth (Patient not taking: Reported on 3/4/2025)      ibuprofen (ADVIL;MOTRIN) 100 MG tablet Take by mouth every 6 hours as needed (Patient not taking: